# Patient Record
Sex: MALE | Race: WHITE | HISPANIC OR LATINO
[De-identification: names, ages, dates, MRNs, and addresses within clinical notes are randomized per-mention and may not be internally consistent; named-entity substitution may affect disease eponyms.]

---
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
## 2023-01-09 RX ORDER — HYDROCORTISONE 25 MG/G
2.5 OINTMENT TOPICAL
Qty: 1 | Refills: 3 | Status: ACTIVE | COMMUNITY
Start: 2017-12-12 | End: 1900-01-01

## 2023-01-30 RX ORDER — DUPILUMAB 300 MG/2ML
300 INJECTION, SOLUTION SUBCUTANEOUS
Qty: 1 | Refills: 5 | Status: ACTIVE | COMMUNITY
Start: 2022-11-01 | End: 1900-01-01

## 2023-01-31 RX ORDER — DUPILUMAB 300 MG/2ML
300 INJECTION, SOLUTION SUBCUTANEOUS
Qty: 4 | Refills: 0 | Status: ACTIVE | COMMUNITY
Start: 2022-11-01

## 2023-01-31 RX ORDER — DUPILUMAB 200 MG/1.14ML
200 INJECTION, SOLUTION SUBCUTANEOUS
Qty: 1 | Refills: 6 | Status: ACTIVE | COMMUNITY
Start: 2019-08-01

## 2023-02-01 ENCOUNTER — OUTPATIENT (OUTPATIENT)
Dept: OUTPATIENT SERVICES | Facility: HOSPITAL | Age: 18
LOS: 1 days | End: 2023-02-01

## 2023-02-01 ENCOUNTER — APPOINTMENT (OUTPATIENT)
Dept: INTERNAL MEDICINE | Facility: CLINIC | Age: 18
End: 2023-02-01

## 2023-02-02 ENCOUNTER — APPOINTMENT (OUTPATIENT)
Dept: DERMATOLOGY | Facility: CLINIC | Age: 18
End: 2023-02-02
Payer: MEDICAID

## 2023-02-02 PROCEDURE — 99215 OFFICE O/P EST HI 40 MIN: CPT | Mod: 25

## 2023-02-02 PROCEDURE — 96372 THER/PROPH/DIAG INJ SC/IM: CPT

## 2023-02-09 ENCOUNTER — NON-APPOINTMENT (OUTPATIENT)
Age: 18
End: 2023-02-09

## 2023-02-17 ENCOUNTER — APPOINTMENT (OUTPATIENT)
Dept: OPHTHALMOLOGY | Facility: CLINIC | Age: 18
End: 2023-02-17
Payer: MEDICAID

## 2023-02-17 ENCOUNTER — NON-APPOINTMENT (OUTPATIENT)
Age: 18
End: 2023-02-17

## 2023-02-17 PROCEDURE — 92012 INTRM OPH EXAM EST PATIENT: CPT

## 2023-03-13 ENCOUNTER — NON-APPOINTMENT (OUTPATIENT)
Age: 18
End: 2023-03-13

## 2023-03-14 ENCOUNTER — APPOINTMENT (OUTPATIENT)
Dept: DERMATOLOGY | Facility: CLINIC | Age: 18
End: 2023-03-14
Payer: MEDICAID

## 2023-03-14 PROCEDURE — 99214 OFFICE O/P EST MOD 30 MIN: CPT

## 2023-03-17 ENCOUNTER — APPOINTMENT (OUTPATIENT)
Dept: OPHTHALMOLOGY | Facility: CLINIC | Age: 18
End: 2023-03-17
Payer: MEDICAID

## 2023-03-17 ENCOUNTER — NON-APPOINTMENT (OUTPATIENT)
Age: 18
End: 2023-03-17

## 2023-03-17 PROCEDURE — 92012 INTRM OPH EXAM EST PATIENT: CPT

## 2023-03-20 RX ORDER — FOLIC ACID 1 MG/1
1 TABLET ORAL DAILY
Qty: 60 | Refills: 5 | Status: ACTIVE | COMMUNITY
Start: 2022-10-19 | End: 1900-01-01

## 2023-04-04 ENCOUNTER — LABORATORY RESULT (OUTPATIENT)
Age: 18
End: 2023-04-04

## 2023-04-04 ENCOUNTER — APPOINTMENT (OUTPATIENT)
Dept: DERMATOLOGY | Facility: CLINIC | Age: 18
End: 2023-04-04
Payer: MEDICAID

## 2023-04-04 VITALS — HEIGHT: 66 IN | WEIGHT: 130 LBS | BODY MASS INDEX: 20.89 KG/M2

## 2023-04-04 DIAGNOSIS — D48.5 NEOPLASM OF UNCERTAIN BEHAVIOR OF SKIN: ICD-10-CM

## 2023-04-04 PROCEDURE — 11102 TANGNTL BX SKIN SINGLE LES: CPT

## 2023-04-04 PROCEDURE — 99214 OFFICE O/P EST MOD 30 MIN: CPT | Mod: 25

## 2023-05-12 ENCOUNTER — NON-APPOINTMENT (OUTPATIENT)
Age: 18
End: 2023-05-12

## 2023-05-14 LAB — DERMATOLOGY BIOPSY: NORMAL

## 2023-05-21 ENCOUNTER — NON-APPOINTMENT (OUTPATIENT)
Age: 18
End: 2023-05-21

## 2023-05-30 ENCOUNTER — APPOINTMENT (OUTPATIENT)
Dept: DERMATOLOGY | Facility: CLINIC | Age: 18
End: 2023-05-30
Payer: MEDICAID

## 2023-05-30 PROCEDURE — 99214 OFFICE O/P EST MOD 30 MIN: CPT

## 2023-06-06 NOTE — HISTORY OF PRESENT ILLNESS
[FreeTextEntry1] : f/u AD, ?CTCL [de-identified] : Recent biopsy more suggestive of psoriasis \par \par Prior hx \par 1. Atopic dermatitis with possible early CTCL. \par - Recently here, 3/17/23, since LV, not using topical steroids on L thigh. Most recently, re-trialed dupixent x 1 dose, and noted significant achy eyes/ dizziness impacting daily life, thus stopped. Did not notice any difference in skin. Saw ophtho who gave steroid drops but decided to stop medication. Currently using tac 1 week on/ 1 week off for body, and HC for the face. Feels rash has overall been stable, notes intermittent bouts of significant pruritus\par \par Optho hx\par - evaled by DR. Land 12/23/2022 and cleared for dupi. No e/o conjunctivitis on eaxm. \par - Previously evaluated by peds optho at [facility] in 7/2021 who determined no ocular cause of blurry vision. \par \par HISTORY:\par - s/p 34 sessions of phototherapy at [facility] with an unknown amount received elsewhere outside of [facility], last treatment 5/9, stopped due to developing painful red erythema 2-3 hrs following treatment, does not feel there was any improvement with phototherapy. \par - previously improved on dupixent 200mg q 2 weeks but stopped 11/2020 2/2/ vision problems. Vision problems persists. Opthoi does not tihnk due to dupi\par - Repeat shave biopsy suggestive of early CTCL (early MF) arising in AD, read by Dr. Acevedo at [place]. TCR gene rearrangement studies were not done on 6/2021 biopsy. \par - Prev on MTX since 6/2022, most recent dose since 10/2022 25mg weekly - 2/2023.\par \par s/p patch testing 2018 -at that time sent a safe list to email address ([email or web address]) given for the allergens positive from 1-50\par - colophony\par - disperse blue mix \par - cocamidopropyl betaine\par \par M: Vaseline\par S: Cerave\par D: All Free and Clear, no fabric softener/dryer sheets

## 2023-06-06 NOTE — PHYSICAL EXAM
[Alert] : alert [Oriented x 3] : ~L oriented x 3 [Well Nourished] : well nourished [Conjunctiva Non-injected] : conjunctiva non-injected [No Visual Lymphadenopathy] : no visual  lymphadenopathy [No Clubbing] : no clubbing [No Edema] : no edema [No Chromhidrosis] : no chromhidrosis [No Bromhidrosis] : no bromhidrosis [FreeTextEntry3] : Exam notable for: \par - multiple pink to hypopigmented erythematous plaques and patches on the primarily on legs today >anterior trunk\par - hypopigmented patches noted on extremities (arms > legs)  \par - buttocks clear \par \par

## 2023-06-06 NOTE — ASSESSMENT
[FreeTextEntry1] : 1. Atopic dermatitis with possible 2. CTCL (early mycosis fungoides)\par chronic, flaring  on extensor extremities, and anterior trunk today \par Patient previously presented at  Jan-Feb, 2022\par Morphology today with pink eczematous thin plaques and xerotic patches  involving extensor extremities>anterior trunk. Face involved previously, overall inactive today.\par Prev on MTX since 6/2022, most recent dose since 10/2022 25mg weekly - 2/2023. No improvement and had hair loss. \par s/p nbUVB 1/2022-5/2022. No longer on phototherapy 2/2 red erythema 2-3 hrs following NBUVB session s/p 34 sessions at [facility] with unknown amt outside, without clinical improvement \par Skin improved on dupi but was stopped 11/2020 due to blurry vision that has persisted. Normal eye exam per optho\par Cleared by Dr. Land (optho) for dupixent 12/2022, re-tried Feb 2023 loading dose and 3 days later again developed eye symptoms and decided to self stop \par ---- multiple biopsies ----\par Shave biopsy of lesion (10/2018) w/ subacute spongiotic dermatitis consistent with AD\par Repeat broad shave 06/2021 c/w prior, spongiotic and psoriasiform dermatitis,CD4: CD8 ratio is normal, no loss of CD7, c/w subactue eczematous process \par Labs 7/21 (cbc, cmp, peripheral flow) wnl.\par 1/22 Prior Bx x3 sent to Dr. Acevedo at [place] -- C/w CTCL/early MF arising within the background of atopic dermatitis (TCR gene rearrangement studies not done)\par Most recent biopsy 4/2023 suggestive of psoriasis \par ------------------------------\par \par Reviewed prior biopsies and workup - overall pt has had biopsies suggestive of chronic atopic dermatitis without improvement on standard therapies. One biopsy suggestive of MF but not supported by other biopsies\par Given recent biopsy suggestive of psoriasis,\par Discussed trial of tx for psoriasis - reviewed spectrum of tx options including IL-17 inhibitors. Will put in rx for cosentyx. reviewed se - will rediscuss once approved \par If no improvement/worsening, plan for rebiopsy prior to proceeding with alternative systemic \par Baseline labs - cbc, cmp, quantiferon gold, hepatitis.\par \par - continue allergen avoidance and use of SAFE list (prior test in 2018). Consider repeating patch testing. \par - gentle skin care discussed, emphasized the importance of liberal Vaseline use\par - FACE: c/w HC 2.5% ointment BID to affected areas on the face prn flare, 2 weeks on/1 week off, SED\par - For the body/hands:  c/w triamcinolone 0.1% ointment BID PRN itch/roughness use for up to 2 weeks on, then 1 week off. Repeat as needed. SED\par  \par \par \par

## 2023-06-09 ENCOUNTER — APPOINTMENT (OUTPATIENT)
Dept: DERMATOLOGY | Facility: CLINIC | Age: 18
End: 2023-06-09

## 2023-06-21 ENCOUNTER — NON-APPOINTMENT (OUTPATIENT)
Age: 18
End: 2023-06-21

## 2023-06-21 ENCOUNTER — APPOINTMENT (OUTPATIENT)
Dept: OPHTHALMOLOGY | Facility: CLINIC | Age: 18
End: 2023-06-21
Payer: MEDICAID

## 2023-06-21 PROCEDURE — 92012 INTRM OPH EXAM EST PATIENT: CPT

## 2023-06-26 ENCOUNTER — APPOINTMENT (OUTPATIENT)
Dept: DERMATOLOGY | Facility: CLINIC | Age: 18
End: 2023-06-26
Payer: MEDICAID

## 2023-06-26 DIAGNOSIS — L40.9 PSORIASIS, UNSPECIFIED: ICD-10-CM

## 2023-06-26 PROCEDURE — 96372 THER/PROPH/DIAG INJ SC/IM: CPT

## 2023-06-26 PROCEDURE — 99214 OFFICE O/P EST MOD 30 MIN: CPT | Mod: 25

## 2023-06-26 PROCEDURE — 96401 CHEMO ANTI-NEOPL SQ/IM: CPT

## 2023-06-28 ENCOUNTER — OUTPATIENT (OUTPATIENT)
Dept: OUTPATIENT SERVICES | Facility: HOSPITAL | Age: 18
LOS: 1 days | End: 2023-06-28

## 2023-06-28 ENCOUNTER — APPOINTMENT (OUTPATIENT)
Dept: INTERNAL MEDICINE | Facility: CLINIC | Age: 18
End: 2023-06-28

## 2023-06-28 RX ORDER — SECUKINUMAB 150 MG/ML
INJECTION SUBCUTANEOUS
Qty: 5 | Refills: 1 | Status: ACTIVE | COMMUNITY
Start: 2023-06-06

## 2023-06-29 ENCOUNTER — APPOINTMENT (OUTPATIENT)
Dept: DERMATOLOGY | Facility: CLINIC | Age: 18
End: 2023-06-29
Payer: MEDICAID

## 2023-06-29 PROCEDURE — 96401 CHEMO ANTI-NEOPL SQ/IM: CPT

## 2023-06-29 PROCEDURE — 99214 OFFICE O/P EST MOD 30 MIN: CPT | Mod: 25

## 2023-06-29 PROCEDURE — 96372 THER/PROPH/DIAG INJ SC/IM: CPT

## 2023-06-29 NOTE — ASSESSMENT
[FreeTextEntry1] : 1. Atopic dermatitis with possible 2. CTCL (early mycosis fungoides)\par Chronic, flaring  on extensor extremities, and anterior trunk today \par Patient previously presented at  Jan-Feb, 2022\par Morphology today with pink eczematous thin plaques and xerotic patches  involving extensor extremities>anterior trunk. Face involved previously, overall inactive today.\par Prev on MTX since 6/2022, most recent dose since 10/2022 25mg weekly - 2/2023. No improvement and had hair loss. \par s/p nbUVB 1/2022-5/2022. No longer on phototherapy 2/2 red erythema 2-3 hrs following NBUVB session s/p 34 sessions at [facility] with unknown amt outside, without clinical improvement \par Skin improved on dupi but was stopped 11/2020 due to blurry vision that has persisted. Normal eye exam per optho\par Cleared by Dr. Land (optho) for dupixent 12/2022, re-tried Feb 2023 loading dose and 3 days later again developed eye symptoms and decided to self stop \par ---- multiple biopsies ----\par Shave biopsy of lesion (10/2018) w/ subacute spongiotic dermatitis consistent with AD\par Repeat broad shave 06/2021 c/w prior, spongiotic and psoriasiform dermatitis,CD4: CD8 ratio is normal, no loss of CD7, c/w subactue eczematous process \par Labs 7/21 (cbc, cmp, peripheral flow) wnl.\par 1/22 Prior Bx x3 sent to Dr. Acevedo at [place] -- C/w CTCL/early MF arising within the background of atopic dermatitis (TCR gene rearrangement studies not done)\par Most recent biopsy 4/2023 suggestive of #psoriasis \par ------------------------------\par \par Reviewed prior biopsies and workup - overall pt has had biopsies suggestive of chronic atopic dermatitis without improvement on standard therapies. One biopsy suggestive of MF but not supported by other biopsies\par Given recent biopsy suggestive of psoriasis, Dr. Quezada previously discussed trial of tx for psoriasis - reviewed spectrum of tx options including IL-17 inhibitors. \par Start Cosentyx (Secukinumab)300 mg SC once weekly on weeks 0,1,2,3,4, then once every 4 weeks for maintenance. \par \par Extensively discussed secukinumab administration. \par 150 mg secukinumab administered into Patient's left anterior thigh, and Patient's Mom was able to administer 150 mg SC to his right thigh.\par Lot [id number]\par Exp 6/2024\par \par - Reviewed biologics and discussed the possible adverse effects of this medication, including but not limited to common and uncommon side effects of biologic medications including injection site reactions, flu-like symptoms, skin and soft tissue infections, candidiasis, pneumonia, tuberculosis, other serious and opportunistic infections, low blood counts, inflammation of the liver, hypersensitivity reaction, new onset of inflammatory bowel disease, multiple sclerosis, heart failure, and cancers.  \par - We have discussed that pregnancy is non-viable result in significant fetal abnormality while on this medication and 3 months after completing course, and that the Patient must take adequate measures to eliminate risk of conceiving or participation in conceiving while on this medication.\par Patient expressed understanding of these risks.\par \par If no improvement/worsening, plan for rebiopsy prior to proceeding with alternative systemic. \par \par - continue allergen avoidance and use of SAFE list (prior test in 2018). Consider repeating patch testing. \par - gentle skin care discussed, emphasized the importance of liberal Vaseline use\par - FACE: c/w HC 2.5% ointment BID to affected areas on the face prn flare, 2 weeks on/1 week off, SED\par - For the body/hands:  c/w triamcinolone 0.1% ointment BID PRN itch/roughness use for up to 2 weeks on, then 1 week off. Repeat as needed. SED. \par \par 3. High Risk Medication Use\par - Reviewed CBC w/ diff, CMP, Hep B core Ab, surface Ab, surface Ag, Reviewed Hep C Ab w/ reflex from May 2023- WNL. \par - Reviewed Quantiferon plus TB from June 2022- non-reactive. \par \par F/u with Dr. Quezada in 1-2 months or sooner as warranted. \par \par \par \par \par

## 2023-06-29 NOTE — PHYSICAL EXAM
[Alert] : alert [Oriented x 3] : ~L oriented x 3 [Well Nourished] : well nourished [Conjunctiva Non-injected] : conjunctiva non-injected [No Visual Lymphadenopathy] : no visual  lymphadenopathy [No Clubbing] : no clubbing [No Edema] : no edema [No Bromhidrosis] : no bromhidrosis [No Chromhidrosis] : no chromhidrosis [FreeTextEntry3] : Exam notable for: \par - multiple pink to hypopigmented erythematous plaques and patches on the primarily on legs today >anterior trunk\par - hypopigmented patches noted on extremities (arms > legs)  \par - buttocks clear \par \par

## 2023-06-29 NOTE — HISTORY OF PRESENT ILLNESS
[FreeTextEntry1] : f/u AD, ?CTCL [de-identified] : Recent biopsy more suggestive of psoriasis. Here to start Cosentyx. \par Presents today with Mom who wishes to learn injection training. Mom is Vietnamese speaking and denies a , preferring Patient to translate this visit. \par \par Prior hx \par 1. Atopic dermatitis with possible early CTCL. \par - Recently here, 3/17/23, since LV, not using topical steroids on L thigh. Most recently, re-trialed dupixent x 1 dose, and noted significant achy eyes/ dizziness impacting daily life, thus stopped. Did not notice any difference in skin. Saw ophtho who gave steroid drops but decided to stop medication. Currently using tac 1 week on/ 1 week off for body, and HC for the face. Feels rash has overall been stable, notes intermittent bouts of significant pruritus\par \par Optho hx\par - evaled by DR. Land 12/23/2022 and cleared for dupi. No e/o conjunctivitis on eaxm. \par - Previously evaluated by peds optho at [facility] in 7/2021 who determined no ocular cause of blurry vision. \par \par HISTORY:\par - s/p 34 sessions of phototherapy at [facility] with an unknown amount received elsewhere outside of [facility], last treatment 5/9, stopped due to developing painful red erythema 2-3 hrs following treatment, does not feel there was any improvement with phototherapy. \par - previously improved on dupixent 200mg q 2 weeks but stopped 11/2020 2/2/ vision problems. Vision problems persists. Opthoi does not tihnk due to dupi\par - Repeat shave biopsy suggestive of early CTCL (early MF) arising in AD, read by Dr. Acevedo at [place]. TCR gene rearrangement studies were not done on 6/2021 biopsy. \par - Prev on MTX since 6/2022, most recent dose since 10/2022 25mg weekly - 2/2023.\par \par s/p patch testing 2018 -at that time sent a safe list to email address ([email or web address]) given for the allergens positive from 1-50\par - colophony\par - disperse blue mix \par - cocamidopropyl betaine\par \par M: Vaseline\par S: Cerave\par D: All Free and Clear, no fabric softener/dryer sheets

## 2023-07-09 LAB
ALBUMIN SERPL ELPH-MCNC: 5.1 G/DL
ALP BLD-CCNC: 70 U/L
ALT SERPL-CCNC: 32 U/L
ANION GAP SERPL CALC-SCNC: 13 MMOL/L
AST SERPL-CCNC: 27 U/L
BILIRUB SERPL-MCNC: 0.3 MG/DL
BUN SERPL-MCNC: 12 MG/DL
CALCIUM SERPL-MCNC: 10.4 MG/DL
CHLORIDE SERPL-SCNC: 102 MMOL/L
CO2 SERPL-SCNC: 27 MMOL/L
CREAT SERPL-MCNC: 0.72 MG/DL
EGFR: 135 ML/MIN/1.73M2
GLUCOSE SERPL-MCNC: 87 MG/DL
HBV CORE IGG+IGM SER QL: NONREACTIVE
HBV SURFACE AB SER QL: REACTIVE
HBV SURFACE AG SER QL: NONREACTIVE
HCV AB SER QL: NONREACTIVE
HCV S/CO RATIO: 0.15 S/CO
POTASSIUM SERPL-SCNC: 4.1 MMOL/L
PROT SERPL-MCNC: 7.5 G/DL
SODIUM SERPL-SCNC: 142 MMOL/L

## 2023-07-10 PROBLEM — L40.9 PSORIASIS: Status: ACTIVE | Noted: 2023-06-06

## 2023-07-27 ENCOUNTER — NON-APPOINTMENT (OUTPATIENT)
Age: 18
End: 2023-07-27

## 2023-08-29 ENCOUNTER — APPOINTMENT (OUTPATIENT)
Dept: DERMATOLOGY | Facility: CLINIC | Age: 18
End: 2023-08-29

## 2023-10-09 ENCOUNTER — NON-APPOINTMENT (OUTPATIENT)
Age: 18
End: 2023-10-09

## 2023-10-23 RX ORDER — SECUKINUMAB 150 MG/ML
150 INJECTION SUBCUTANEOUS
Qty: 4 | Refills: 2 | Status: ACTIVE | COMMUNITY
Start: 2023-06-08 | End: 1900-01-01

## 2023-10-24 ENCOUNTER — LABORATORY RESULT (OUTPATIENT)
Age: 18
End: 2023-10-24

## 2023-10-24 ENCOUNTER — APPOINTMENT (OUTPATIENT)
Dept: DERMATOLOGY | Facility: CLINIC | Age: 18
End: 2023-10-24
Payer: MEDICAID

## 2023-10-24 PROCEDURE — 99215 OFFICE O/P EST HI 40 MIN: CPT

## 2023-10-24 RX ORDER — FLUOCINOLONE ACETONIDE 0.11 MG/ML
0.01 OIL TOPICAL
Qty: 1 | Refills: 3 | Status: ACTIVE | COMMUNITY
Start: 2021-06-09 | End: 1900-01-01

## 2023-10-31 ENCOUNTER — APPOINTMENT (OUTPATIENT)
Dept: DERMATOLOGY | Facility: CLINIC | Age: 18
End: 2023-10-31

## 2023-10-31 ENCOUNTER — NON-APPOINTMENT (OUTPATIENT)
Age: 18
End: 2023-10-31

## 2023-11-16 ENCOUNTER — APPOINTMENT (OUTPATIENT)
Dept: DERMATOLOGY | Facility: CLINIC | Age: 18
End: 2023-11-16

## 2023-11-21 ENCOUNTER — APPOINTMENT (OUTPATIENT)
Dept: DERMATOLOGY | Facility: CLINIC | Age: 18
End: 2023-11-21

## 2023-11-30 ENCOUNTER — NON-APPOINTMENT (OUTPATIENT)
Age: 18
End: 2023-11-30

## 2023-12-05 ENCOUNTER — NON-APPOINTMENT (OUTPATIENT)
Age: 18
End: 2023-12-05

## 2023-12-05 ENCOUNTER — RX RENEWAL (OUTPATIENT)
Age: 18
End: 2023-12-05

## 2023-12-07 ENCOUNTER — APPOINTMENT (OUTPATIENT)
Dept: DERMATOLOGY | Facility: CLINIC | Age: 18
End: 2023-12-07
Payer: MEDICAID

## 2023-12-07 DIAGNOSIS — L21.9 SEBORRHEIC DERMATITIS, UNSPECIFIED: ICD-10-CM

## 2023-12-07 DIAGNOSIS — C84.A0 CUTANEOUS T-CELL LYMPHOMA, UNSPECIFIED, UNSPECIFIED SITE: ICD-10-CM

## 2023-12-07 PROCEDURE — 96372 THER/PROPH/DIAG INJ SC/IM: CPT

## 2023-12-07 PROCEDURE — 99214 OFFICE O/P EST MOD 30 MIN: CPT | Mod: 25

## 2023-12-07 PROCEDURE — 96401 CHEMO ANTI-NEOPL SQ/IM: CPT

## 2023-12-07 RX ORDER — TRALOKINUMAB-LDRM 150 MG/ML
150 INJECTION, SOLUTION SUBCUTANEOUS
Qty: 2 | Refills: 0 | Status: ACTIVE | COMMUNITY
Start: 2023-10-30

## 2023-12-21 ENCOUNTER — APPOINTMENT (OUTPATIENT)
Dept: DERMATOLOGY | Facility: CLINIC | Age: 18
End: 2023-12-21
Payer: MEDICAID

## 2023-12-21 DIAGNOSIS — L20.9 ATOPIC DERMATITIS, UNSPECIFIED: ICD-10-CM

## 2023-12-21 PROCEDURE — 99215 OFFICE O/P EST HI 40 MIN: CPT | Mod: 25

## 2023-12-21 PROCEDURE — 11901 INJECT SKIN LESIONS >7: CPT

## 2024-01-08 ENCOUNTER — APPOINTMENT (OUTPATIENT)
Dept: DERMATOLOGY | Facility: CLINIC | Age: 19
End: 2024-01-08
Payer: MEDICAID

## 2024-01-08 PROCEDURE — 11901 INJECT SKIN LESIONS >7: CPT

## 2024-01-08 PROCEDURE — 99214 OFFICE O/P EST MOD 30 MIN: CPT | Mod: 25

## 2024-02-01 ENCOUNTER — APPOINTMENT (OUTPATIENT)
Dept: DERMATOLOGY | Facility: CLINIC | Age: 19
End: 2024-02-01
Payer: MEDICAID

## 2024-02-01 PROCEDURE — 11901 INJECT SKIN LESIONS >7: CPT

## 2024-02-01 PROCEDURE — 99214 OFFICE O/P EST MOD 30 MIN: CPT | Mod: 25

## 2024-02-01 RX ORDER — KETOCONAZOLE 20.5 MG/ML
2 SHAMPOO, SUSPENSION TOPICAL
Qty: 1 | Refills: 5 | Status: ACTIVE | COMMUNITY
Start: 2019-12-05 | End: 1900-01-01

## 2024-02-09 RX ORDER — TRIAMCINOLONE ACETONIDE 1 MG/G
0.1 OINTMENT TOPICAL
Qty: 1 | Refills: 1 | Status: ACTIVE | COMMUNITY
Start: 2018-10-24 | End: 1900-01-01

## 2024-03-05 ENCOUNTER — APPOINTMENT (OUTPATIENT)
Dept: DERMATOLOGY | Facility: CLINIC | Age: 19
End: 2024-03-05
Payer: MEDICAID

## 2024-03-05 PROCEDURE — 11901 INJECT SKIN LESIONS >7: CPT

## 2024-03-05 PROCEDURE — 99214 OFFICE O/P EST MOD 30 MIN: CPT | Mod: 25

## 2024-04-23 ENCOUNTER — RX RENEWAL (OUTPATIENT)
Age: 19
End: 2024-04-23

## 2024-04-30 RX ORDER — CLOBETASOL PROPIONATE 0.5 MG/ML
0.05 SOLUTION TOPICAL
Qty: 1 | Refills: 2 | Status: ACTIVE | COMMUNITY
Start: 2022-07-26 | End: 1900-01-01

## 2024-05-24 RX ORDER — TRALOKINUMAB-LDRM 150 MG/ML
150 INJECTION, SOLUTION SUBCUTANEOUS
Qty: 1 | Refills: 5 | Status: ACTIVE | COMMUNITY
Start: 2023-10-30

## 2024-06-03 ENCOUNTER — APPOINTMENT (OUTPATIENT)
Dept: DERMATOLOGY | Facility: CLINIC | Age: 19
End: 2024-06-03
Payer: COMMERCIAL

## 2024-06-03 DIAGNOSIS — Z79.899 OTHER LONG TERM (CURRENT) DRUG THERAPY: ICD-10-CM

## 2024-06-03 DIAGNOSIS — L63.9 ALOPECIA AREATA, UNSPECIFIED: ICD-10-CM

## 2024-06-03 DIAGNOSIS — L30.9 DERMATITIS, UNSPECIFIED: ICD-10-CM

## 2024-06-03 PROCEDURE — 99214 OFFICE O/P EST MOD 30 MIN: CPT

## 2024-06-03 RX ORDER — TACROLIMUS 1 MG/G
0.1 OINTMENT TOPICAL
Qty: 1 | Refills: 3 | Status: ACTIVE | COMMUNITY
Start: 2018-01-30 | End: 1900-01-01

## 2024-06-07 LAB
ALBUMIN SERPL ELPH-MCNC: 4.8 G/DL
ALP BLD-CCNC: 63 U/L
ALT SERPL-CCNC: 32 U/L
ANION GAP SERPL CALC-SCNC: 14 MMOL/L
AST SERPL-CCNC: 27 U/L
BILIRUB SERPL-MCNC: 0.3 MG/DL
BUN SERPL-MCNC: 18 MG/DL
CALCIUM SERPL-MCNC: 9.9 MG/DL
CHLORIDE SERPL-SCNC: 100 MMOL/L
CHOLEST SERPL-MCNC: 145 MG/DL
CO2 SERPL-SCNC: 23 MMOL/L
CREAT SERPL-MCNC: 0.79 MG/DL
EGFR: 130 ML/MIN/1.73M2
GLUCOSE SERPL-MCNC: 83 MG/DL
HBV CORE IGG+IGM SER QL: NONREACTIVE
HBV CORE IGM SER QL: NONREACTIVE
HBV SURFACE AB SER QL: REACTIVE
HBV SURFACE AG SER QL: NONREACTIVE
HCV AB SER QL: NONREACTIVE
HCV S/CO RATIO: 0.15 S/CO
HDLC SERPL-MCNC: 38 MG/DL
LDLC SERPL CALC-MCNC: 91 MG/DL
M TB IFN-G BLD-IMP: NEGATIVE
NONHDLC SERPL-MCNC: 107 MG/DL
POTASSIUM SERPL-SCNC: 4.6 MMOL/L
PROT SERPL-MCNC: 7.7 G/DL
QUANTIFERON TB PLUS MITOGEN MINUS NIL: 2.25 IU/ML
QUANTIFERON TB PLUS NIL: 0.03 IU/ML
QUANTIFERON TB PLUS TB1 MINUS NIL: 0 IU/ML
QUANTIFERON TB PLUS TB2 MINUS NIL: 0 IU/ML
SODIUM SERPL-SCNC: 137 MMOL/L
TRIGL SERPL-MCNC: 84 MG/DL

## 2024-06-10 RX ORDER — UPADACITINIB 15 MG/1
15 TABLET, EXTENDED RELEASE ORAL
Qty: 2 | Refills: 2 | Status: ACTIVE | COMMUNITY
Start: 2024-06-06

## 2024-06-10 NOTE — ASSESSMENT
[FreeTextEntry1] : 1. Dermatitis: favor Atopic dermatitis, prior concern for possible CTCL (early mycosis fungoides, see bx reports). One biopsy also suggestive of possible psoriasis. However, failed cosentyx in the past  Chronic, continues to flare despite Adbry Patient previously presented at  Jan-Feb, 2022 Prev on MTX since 6/2022, most recent dose since 10/2022 25mg weekly - 2/2023. No improvement and had hair loss. s/p nbUVB 1/2022-5/2022. No longer on phototherapy 2/2 red erythema 2-3 hrs following NBUVB session s/p 34 sessions at [facility] with unknown amt outside, without clinical improvement Cosentyx for possible psoriasis in June 2023 however his skin not improved.  Skin improved on dupi but was stopped 11/2020 due to blurry vision that has persisted. Normal eye exam per optho Cleared by Dr. Land (optho) for dupixent 12/2022, re-tried Feb 2023 loading dose and 3 days later again developed eye symptoms and decided to self stop On adbry since 12/7/2023  ---- multiple biopsies ---- Shave biopsy of lesion (10/2018) w/ subacute spongiotic dermatitis consistent with AD Repeat broad shave 06/2021 c/w prior, spongiotic and psoriasiform dermatitis,CD4: CD8 ratio is normal, no loss of CD7, c/w subactue eczematous process Labs 7/21 (cbc, cmp, peripheral flow) wnl. 1/22 Prior Bx x3 sent to Dr. Acevedo at [place] -- C/w CTCL/early MF arising within the background of atopic dermatitis (TCR gene rearrangement studies not done) Most recent biopsy 4/2023 suggestive of #psoriasis ------------------------------  PLAN: - STOP Adbry 300 mg SC every other week.  - continue allergen avoidance and use of SAFE list (prior test in 2018). Consider repeating patch testing. - gentle skin care discussed, emphasized the importance of liberal Vaseline use - FACE: c/w HC 2.5% ointment BID to affected areas on the face prn flare, 2 weeks on/1 week off, SED - For the body/hands: c/w triamcinolone 0.1% ointment BID PRN itch/roughness use for up to 2 weeks on, then 1 week off. Repeat as needed. SED. - RE-START tacrolimus 0.1% BID in between flares  - Discussed that it is too soon to determine efficacy of Adbry, will need to give it more time but in future if it is not improving skin can consider alternatives such as PO Jennifer, which may also provide benefit for AA as described below - start zyrtec nightly for itch -START Rinvoq 15mg daily pending bloodwork SED. suppresses immune system, increased risk of blood clots, elevated cholesterol, decreased platelets  Hair loss, likely 2/2 Alopecia Areata Fungal culture from 11/07/23 negative.  - Resolved s/p ILK x 4   High Risk Medication Use, Rinvoq  - Ordered CBC, CMP, lipid profile, hepatitis panel, and quant tb this visit  RTC 6 weeks

## 2024-06-10 NOTE — PHYSICAL EXAM
[FreeTextEntry3] : Exam notable for: - multiple pink to hypopigmented erythematous plaques and patches on the arms, thighs, trunk, improved from prior  - scalp without scale and circular patches of hair loss

## 2024-06-10 NOTE — HISTORY OF PRESENT ILLNESS
[FreeTextEntry1] : f/u hair loss and rash  [de-identified] : ISABELLA HICKS is a 18-year-old male who presents for evaluation of the above.  1. AA s/p ILK 10 x1 and ILK 5 x1, 4 sessions total w/ significant improvement  2. Dermatitis  - continues on Adbry 300 mg sc every other week, tolerating well, no side effects - today skin is improved, using topical steroids PRN flares, overall slightly improved  - itch and flaking on scalp improved with keto shampoo and clobetasol solution - currently using vaseline as moisturizer  - describing redness and burning after sun exposure   Optho hx - evaled by DR. Land 12/23/2022 and cleared for dupi. No e/o conjunctivitis on eaxm. - Previously evaluated by peds optho at [facility] in 7/2021 who determined no ocular cause of blurry vision.  HISTORY: - s/p 34 sessions of phototherapy at [facility] with an unknown amount received elsewhere outside of [facility], last treatment 5/9, stopped due to developing painful red erythema 2-3 hrs following treatment, does not feel there was any improvement with phototherapy. - previously improved on dupixent 200mg q 2 weeks but stopped 11/2020 2/2/ vision problems. Vision problems persists. Opthoi does not think due to dupi - re-trialed dupixent x 1 dose, and noted significant achy eyes/ dizziness impacting daily life, thus stopped. Did not notice any difference in skin. 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 Acevedo at [place]. TCR gene rearrangement studies were not done on 6/2021 biopsy. - Prev on MTX since 6/2022, most recent dose since 10/2022 25mg weekly - 2/2023.  s/p patch testing 2018 -at that time sent a safe list to email address ([email or web address]) given for the allergens positive from 1-50 - colophony - disperse blue mix - cocamidopropyl betaine  M: Vaseline S: Cerave D: All Free and Clear, no fabric softener/dryer sheets

## 2024-07-16 ENCOUNTER — APPOINTMENT (OUTPATIENT)
Dept: DERMATOLOGY | Facility: CLINIC | Age: 19
End: 2024-07-16
Payer: COMMERCIAL

## 2024-07-16 DIAGNOSIS — L21.9 SEBORRHEIC DERMATITIS, UNSPECIFIED: ICD-10-CM

## 2024-07-16 DIAGNOSIS — L30.9 DERMATITIS, UNSPECIFIED: ICD-10-CM

## 2024-07-16 PROCEDURE — G2211 COMPLEX E/M VISIT ADD ON: CPT

## 2024-07-16 PROCEDURE — 99214 OFFICE O/P EST MOD 30 MIN: CPT

## 2024-08-19 ENCOUNTER — NON-APPOINTMENT (OUTPATIENT)
Age: 19
End: 2024-08-19

## 2024-08-20 ENCOUNTER — APPOINTMENT (OUTPATIENT)
Dept: DERMATOLOGY | Facility: CLINIC | Age: 19
End: 2024-08-20
Payer: COMMERCIAL

## 2024-08-20 VITALS — HEIGHT: 66 IN | BODY MASS INDEX: 22.5 KG/M2 | WEIGHT: 140 LBS

## 2024-08-20 DIAGNOSIS — L20.9 ATOPIC DERMATITIS, UNSPECIFIED: ICD-10-CM

## 2024-08-20 PROCEDURE — G2211 COMPLEX E/M VISIT ADD ON: CPT

## 2024-08-20 PROCEDURE — 99215 OFFICE O/P EST HI 40 MIN: CPT

## 2024-08-21 NOTE — ASSESSMENT
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  - One biopsy also suggestive of possible psoriasis. However, failed cosentyx in the past  Chronic, not at goal Patient previously presented at  Jan-Feb, 2022 s/p patch testing 2018 -at that time sent a safe list to email address ([email or web address]) given for the allergens positive from 1-50 - colophony - disperse blue mix - cocamidopropyl betaine  [place] HISTORY:  - Dupixent 9/2019-11/2020, stopped due to blurry vision that has persisted. Normal eye exam per optho (Cleared by Dr. Land) for dupixent 12/2022, re-tried Feb 2023 loading dose and 3 days later again developed eye symptoms and decided to self-stop - nbUVB 1/2022-5/2022. No longer on phototherapy 2/2 red erythema 2-3 hrs following NBUVB session s/p 34 sessions at [facility] with unknown amt outside, without clinical improvement - MTX 6/2022-2/2023 - Cosentyx for possible psoriasis in June 2023 however his skin not improved.  - Adbry 12/7/2023 to 7/2024 - Rinvoq 15 mg daily since early July 2024- self stopped 2 weeks ago due to eye symptoms. Has not been to ophtho for re-evaluation.  Skin is flaring off all treatments   ---- multiple biopsies ---- Shave biopsy of lesion (10/2018) w/ subacute spongiotic dermatitis consistent with AD Repeat broad shave 06/2021 c/w prior, spongiotic and psoriasiform dermatitis,CD4: CD8 ratio is normal, no loss of CD7, c/w subactue eczematous process Labs 7/21 (cbc, cmp, peripheral flow) wnl. 1/22 Prior Bx x3 sent to Dr. Acevedo at [place] -- C/w CTCL/early MF arising within the background of atopic dermatitis (TCR gene rearrangement studies not done) Most recent biopsy 4/2023 suggestive of #psoriasis ------------------------------  PLAN: - Recommend ophthalmology (Dr. Land) for evaluation of eye symptoms. May consider neurology consult if ophtho evaluation normal.  - Recommend continuing on Rinvoq 15 mg daily, unable to determine clinical benefit at this time due to intermittent course. Does note hair is falling out more without rinvoq.  - Continue allergen avoidance and use of SAFE list (prior test in 2018). Consider repeating patch testing. - Gentle skin care discussed, emphasized the importance of liberal Vaseline use - For face: continue HC 2.5% ointment BID to affected areas on the face prn flare, 2 weeks on/1 week off, SED - For the body/hands: continue triamcinolone 0.1% ointment BID PRN itch/roughness use for up to 2 weeks on, then 1 week off. Repeat as needed. SED. - Continue tacrolimus 0.1% BID in between flares  - Continue zyrtec nightly for itch prn  # Hair loss, likely 2/2 Alopecia Areata Fungal culture from 11/07/23 negative.  - Resolved s/p ILK x 4 , however pt reports return of symptoms with hair falling out and scalp itch - START clobetasol solution PRN for itch  High Risk Medication Use, Rinvoq  - CBC, CMP, lipid profile, hepatitis panel, and quant tb 6/3/2024 wnl - r/b/a Jennifer discussed including but not limited to immunosuppression, LFT elevation, HLD and blood clots  RTC 1 month   >45 min

## 2024-08-21 NOTE — ASSESSMENT
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  - One biopsy also suggestive of possible psoriasis. However, failed cosentyx in the past  Chronic, not at goal Patient previously presented at  Jan-Feb, 2022 s/p patch testing 2018 -at that time sent a safe list to email address ([email or web address]) given for the allergens positive from 1-50 - colophony - disperse blue mix - cocamidopropyl betaine  [place] HISTORY:  - Dupixent 9/2019-11/2020, stopped due to blurry vision that has persisted. Normal eye exam per optho (Cleared by Dr. Land) for dupixent 12/2022, re-tried Feb 2023 loading dose and 3 days later again developed eye symptoms and decided to self-stop - nbUVB 1/2022-5/2022. No longer on phototherapy 2/2 red erythema 2-3 hrs following NBUVB session s/p 34 sessions at [facility] with unknown amt outside, without clinical improvement - MTX 6/2022-2/2023 - Cosentyx for possible psoriasis in June 2023 however his skin not improved.  - Adbry 12/7/2023 to 7/2024 - Rinvoq 15 mg daily since early July 2024- self stopped 2 weeks ago due to eye symptoms. Has not been to ophtho for re-evaluation.  Skin is flaring off all treatments   ---- multiple biopsies ---- Shave biopsy of lesion (10/2018) w/ subacute spongiotic dermatitis consistent with AD Repeat broad shave 06/2021 c/w prior, spongiotic and psoriasiform dermatitis,CD4: CD8 ratio is normal, no loss of CD7, c/w subactue eczematous process Labs 7/21 (cbc, cmp, peripheral flow) wnl. 1/22 Prior Bx x3 sent to Dr. Acevedo at [place] -- C/w CTCL/early MF arising within the background of atopic dermatitis (TCR gene rearrangement studies not done) Most recent biopsy 4/2023 suggestive of #psoriasis ------------------------------  PLAN: - Recommend ophthalmology (Dr. Land) for evaluation of eye symptoms. May consider neurology consult if ophtho evaluation normal.  - Recommend continuing on Rinvoq 15 mg daily, unable to determine clinical benefit at this time due to intermittent course. Does note hair is falling out more without rinvoq.  - Continue allergen avoidance and use of SAFE list (prior test in 2018). Consider repeating patch testing. - Gentle skin care discussed, emphasized the importance of liberal Vaseline use - For face: continue HC 2.5% ointment BID to affected areas on the face prn flare, 2 weeks on/1 week off, SED - For the body/hands: continue triamcinolone 0.1% ointment BID PRN itch/roughness use for up to 2 weeks on, then 1 week off. Repeat as needed. SED. - Continue tacrolimus 0.1% BID in between flares  - Continue zyrtec nightly for itch prn  # Hair loss, likely 2/2 Alopecia Areata Fungal culture from 11/07/23 negative.  - Resolved s/p ILK x 4 , however pt reports return of symptoms with hair falling out and scalp itch - START clobetasol solution PRN for itch  High Risk Medication Use, Rinvoq  - CBC, CMP, lipid profile, hepatitis panel, and quant tb 6/3/2024 wnl - r/b/a Jennifer discussed including but not limited to immunosuppression, LFT elevation, HLD and blood clots  RTC 1 month   >45 min

## 2024-08-21 NOTE — PHYSICAL EXAM
[FreeTextEntry3] : Exam notable for: - multiple pink to hypopigmented scaley patches on the trunk, knees, arms - scalp with no discrete patches of hair loss

## 2024-08-21 NOTE — HISTORY OF PRESENT ILLNESS
[FreeTextEntry1] : f/u hair loss and rash  [de-identified] : ISABELLA HICKS is a 18-year-old male who presents for evaluation of the above.  Stopped Rinvoq 1.5 weeks ago (started ~6/3/2024) due to blurry vision and difficulties focusing. Reports improvement in eye symptoms after stopping Rinvoq, but not complete resolution. Has not seen Dr. Land for evaluation this time. Regarding his skin, he states back was flaring recently, but improved with triamcinolone ointment. Also feels that hair has been falling out similarly to how things were before his AA flared.   # AA s/p ILK 10 x1 and ILK 5 x1, 4 sessions total w/ significant improvement  Optho hx - evaled by DR. Land 12/23/2022 and cleared for dupi. No e/o conjunctivitis on eaxm. - Previously evaluated by peds optho at [facility] in 7/2021 who determined no ocular cause of blurry vision.  HISTORY: - s/p 34 sessions of phototherapy at [facility] with an unknown amount received elsewhere outside of [facility], last treatment 5/9, stopped due to developing painful red erythema 2-3 hrs following treatment, does not feel there was any improvement with phototherapy. - previously improved on dupixent 200mg q 2 weeks but stopped 11/2020 2/2/ vision problems. Vision problems persists. Optho does not think due to dupi - re-trialed dupixent x 1 dose, and noted significant achy eyes/ dizziness impacting daily life, thus stopped. Did not notice any difference in skin. 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 Acevedo at [place]. TCR gene rearrangement studies were not done on 6/2021 biopsy. - Prev on MTX since 6/2022, most recent dose since 10/2022 25mg weekly - 2/2023. - S/p Adbry 12/7/23-6/3/24 w/ no improvement (no eye symptoms)  s/p patch testing 2018 -at that time sent a safe list to email address ([email or web address]) given for the allergens positive from 1-50 - colophony - disperse blue mix - cocamidopropyl betaine  M: Vaseline S: Cerave D: All Free and Clear, no fabric softener/dryer sheets

## 2024-08-21 NOTE — HISTORY OF PRESENT ILLNESS
[FreeTextEntry1] : f/u hair loss and rash  [de-identified] : ISABELLA HICKS is a 18-year-old male who presents for evaluation of the above.  Stopped Rinvoq 1.5 weeks ago (started ~6/3/2024) due to blurry vision and difficulties focusing. Reports improvement in eye symptoms after stopping Rinvoq, but not complete resolution. Has not seen Dr. Land for evaluation this time. Regarding his skin, he states back was flaring recently, but improved with triamcinolone ointment. Also feels that hair has been falling out similarly to how things were before his AA flared.   # AA s/p ILK 10 x1 and ILK 5 x1, 4 sessions total w/ significant improvement  Optho hx - evaled by DR. Land 12/23/2022 and cleared for dupi. No e/o conjunctivitis on eaxm. - Previously evaluated by peds optho at [facility] in 7/2021 who determined no ocular cause of blurry vision.  HISTORY: - s/p 34 sessions of phototherapy at [facility] with an unknown amount received elsewhere outside of [facility], last treatment 5/9, stopped due to developing painful red erythema 2-3 hrs following treatment, does not feel there was any improvement with phototherapy. - previously improved on dupixent 200mg q 2 weeks but stopped 11/2020 2/2/ vision problems. Vision problems persists. Optho does not think due to dupi - re-trialed dupixent x 1 dose, and noted significant achy eyes/ dizziness impacting daily life, thus stopped. Did not notice any difference in skin. 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 Acevedo at [place]. TCR gene rearrangement studies were not done on 6/2021 biopsy. - Prev on MTX since 6/2022, most recent dose since 10/2022 25mg weekly - 2/2023. - S/p Adbry 12/7/23-6/3/24 w/ no improvement (no eye symptoms)  s/p patch testing 2018 -at that time sent a safe list to email address ([email or web address]) given for the allergens positive from 1-50 - colophony - disperse blue mix - cocamidopropyl betaine  M: Vaseline S: Cerave D: All Free and Clear, no fabric softener/dryer sheets

## 2024-09-04 ENCOUNTER — NON-APPOINTMENT (OUTPATIENT)
Age: 19
End: 2024-09-04

## 2024-09-04 ENCOUNTER — APPOINTMENT (OUTPATIENT)
Dept: OPHTHALMOLOGY | Facility: CLINIC | Age: 19
End: 2024-09-04
Payer: COMMERCIAL

## 2024-09-04 PROCEDURE — 92014 COMPRE OPH EXAM EST PT 1/>: CPT

## 2024-09-19 ENCOUNTER — APPOINTMENT (OUTPATIENT)
Dept: DERMATOLOGY | Facility: CLINIC | Age: 19
End: 2024-09-19
Payer: COMMERCIAL

## 2024-09-19 PROCEDURE — 99214 OFFICE O/P EST MOD 30 MIN: CPT

## 2024-09-19 PROCEDURE — G2211 COMPLEX E/M VISIT ADD ON: CPT

## 2024-09-19 NOTE — HISTORY OF PRESENT ILLNESS
[FreeTextEntry1] : f/u hair loss and rash  [de-identified] : ISABELLA HICKS is a 18-year-old male who presents for evaluation of the above.  #off rinvoq because it affects his vision and has exams coming up, skin flaring now that he is off all treatment, also feels like he is strating to lose hair again.    # AA s/p ILK 10 x1 and ILK 5 x1, 4 sessions total w/ significant improvement  Optho hx - evaled by DR. Land 12/23/2022 and cleared for dupi. No e/o conjunctivitis on eaxm. - Previously evaluated by peds optho at [facility] in 7/2021 who determined no ocular cause of blurry vision.  HISTORY: - s/p 34 sessions of phototherapy at [facility] with an unknown amount received elsewhere outside of [facility], last treatment 5/9, stopped due to developing painful red erythema 2-3 hrs following treatment, does not feel there was any improvement with phototherapy. - previously improved on dupixent 200mg q 2 weeks but stopped 11/2020 2/2/ vision problems. Vision problems persists. Optho does not think due to dupi - re-trialed dupixent x 1 dose, and noted significant achy eyes/ dizziness impacting daily life, thus stopped. Did not notice any difference in skin. 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 Acevedo at [place]. TCR gene rearrangement studies were not done on 6/2021 biopsy. - Prev on MTX since 6/2022, most recent dose since 10/2022 25mg weekly - 2/2023. - S/p Adbry 12/7/23-6/3/24 w/ no improvement (no eye symptoms)  s/p patch testing 2018 -at that time sent a safe list to email address ([email or web address]) given for the allergens positive from 1-50 - colophony - disperse blue mix - cocamidopropyl betaine  M: Vaseline S: Cerave D: All Free and Clear, no fabric softener/dryer sheets

## 2024-09-19 NOTE — ASSESSMENT
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  - One biopsy also suggestive of possible psoriasis. However, failed cosentyx in the past  Chronic, not at goal Patient previously presented at  Jan-Feb, 2022 s/p patch testing 2018 -at that time sent a safe list to email address ([email or web address]) given for the allergens positive from 1-50 - colophony - disperse blue mix - cocamidopropyl betaine  [place] HISTORY:  - Dupixent 9/2019-11/2020, stopped due to blurry vision that has persisted. Normal eye exam per optho (Cleared by Dr. Land) for dupixent 12/2022, re-tried Feb 2023 loading dose and 3 days later again developed eye symptoms and decided to self-stop - nbUVB 1/2022-5/2022. No longer on phototherapy 2/2 red erythema 2-3 hrs following NBUVB session s/p 34 sessions at [facility] with unknown amt outside, without clinical improvement - MTX 6/2022-2/2023 - Cosentyx for possible psoriasis in June 2023 however his skin not improved.  - Adbry 12/7/2023 to 7/2024, stopped as it was not 100% effective and wanted to trial tx that would treat AA as well - s/p Rinvoq 15 mg daily  July 2024-Aug 2024, stopped due to eye symptoms Skin is flaring off all treatments   ---- multiple biopsies ---- Shave biopsy of lesion (10/2018) w/ subacute spongiotic dermatitis consistent with AD Repeat broad shave 06/2021 c/w prior, spongiotic and psoriasiform dermatitis,CD4: CD8 ratio is normal, no loss of CD7, c/w subactue eczematous process Labs 7/21 (cbc, cmp, peripheral flow) wnl. 1/22 Prior Bx x3 sent to Dr. Acevedo at [place] -- C/w CTCL/early MF arising within the background of atopic dermatitis (TCR gene rearrangement studies not done) Most recent biopsy 4/2023 suggestive of #psoriasis ------------------------------  PLAN: - Plan to START abrocitinib 100mg daily pending insurance approval - f/u ophthalmology (Dr. Land) for evaluation of eye symptoms. May consider neurology consult if ophtho evaluation normal.  - Continue allergen avoidance and use of SAFE list (prior test in 2018). Consider repeating patch testing. - Gentle skin care discussed, emphasized the importance of liberal Vaseline use - For face: continue HC 2.5% ointment BID to affected areas on the face prn flare, 2 weeks on/1 week off, SED - For the body/hands: continue triamcinolone 0.1% ointment BID PRN itch/roughness use for up to 2 weeks on, then 1 week off. Repeat as needed. SED. - Continue tacrolimus 0.1% BID in between flares  - Continue zyrtec nightly for itch prn  # Hair loss, likely 2/2 Alopecia Areata Fungal culture from 11/07/23 negative.  - Resolved s/p ILK x 4 , however pt reports return of symptoms with hair falling out and scalp itch - clobetasol solution PRN for itch - abrocitinib as above  High Risk Medication Use, Rinvoq  - CBC, CMP, lipid profile, hepatitis panel, and quant tb 6/3/2024 wnl - r/b/a Jennifer discussed including but not limited to immunosuppression, LFT elevation, HLD and blood clots  RTC 1 month

## 2024-09-27 ENCOUNTER — APPOINTMENT (OUTPATIENT)
Dept: DERMATOLOGY | Facility: CLINIC | Age: 19
End: 2024-09-27

## 2024-10-01 RX ORDER — ABROCITINIB 100 MG/1
100 TABLET, FILM COATED ORAL
Qty: 30 | Refills: 3 | Status: ACTIVE | COMMUNITY
Start: 2024-10-01 | End: 1900-01-01

## 2024-10-15 ENCOUNTER — NON-APPOINTMENT (OUTPATIENT)
Age: 19
End: 2024-10-15

## 2024-10-28 DIAGNOSIS — Z79.899 OTHER LONG TERM (CURRENT) DRUG THERAPY: ICD-10-CM

## 2024-11-14 ENCOUNTER — APPOINTMENT (OUTPATIENT)
Dept: DERMATOLOGY | Facility: CLINIC | Age: 19
End: 2024-11-14
Payer: COMMERCIAL

## 2024-11-14 VITALS — BODY MASS INDEX: 22.5 KG/M2 | WEIGHT: 140 LBS | HEIGHT: 66 IN

## 2024-11-14 PROCEDURE — G2211 COMPLEX E/M VISIT ADD ON: CPT

## 2024-11-14 PROCEDURE — 99214 OFFICE O/P EST MOD 30 MIN: CPT

## 2024-11-17 LAB
ALBUMIN SERPL ELPH-MCNC: 4.6 G/DL
ALP BLD-CCNC: 57 U/L
ALT SERPL-CCNC: 49 U/L
ANION GAP SERPL CALC-SCNC: 11 MMOL/L
AST SERPL-CCNC: 27 U/L
BASOPHILS # BLD AUTO: 0.02 K/UL
BASOPHILS NFR BLD AUTO: 0.3 %
BILIRUB SERPL-MCNC: 0.2 MG/DL
BUN SERPL-MCNC: 16 MG/DL
CALCIUM SERPL-MCNC: 9.9 MG/DL
CHLORIDE SERPL-SCNC: 104 MMOL/L
CO2 SERPL-SCNC: 25 MMOL/L
CREAT SERPL-MCNC: 0.81 MG/DL
EGFR: 129 ML/MIN/1.73M2
EOSINOPHIL # BLD AUTO: 0.12 K/UL
EOSINOPHIL NFR BLD AUTO: 1.6 %
GLUCOSE SERPL-MCNC: 92 MG/DL
HCT VFR BLD CALC: 42.2 %
HGB BLD-MCNC: 14.2 G/DL
IMM GRANULOCYTES NFR BLD AUTO: 0.3 %
LYMPHOCYTES # BLD AUTO: 2.62 K/UL
LYMPHOCYTES NFR BLD AUTO: 35.7 %
MAN DIFF?: NORMAL
MCHC RBC-ENTMCNC: 31 PG
MCHC RBC-ENTMCNC: 33.6 G/DL
MCV RBC AUTO: 92.1 FL
MONOCYTES # BLD AUTO: 0.65 K/UL
MONOCYTES NFR BLD AUTO: 8.9 %
NEUTROPHILS # BLD AUTO: 3.91 K/UL
NEUTROPHILS NFR BLD AUTO: 53.2 %
PLATELET # BLD AUTO: 274 K/UL
POTASSIUM SERPL-SCNC: 4.1 MMOL/L
PROT SERPL-MCNC: 7.4 G/DL
RBC # BLD: 4.58 M/UL
RBC # FLD: 12.1 %
SODIUM SERPL-SCNC: 139 MMOL/L
WBC # FLD AUTO: 7.34 K/UL

## 2024-12-16 ENCOUNTER — APPOINTMENT (OUTPATIENT)
Dept: DERMATOLOGY | Facility: CLINIC | Age: 19
End: 2024-12-16
Payer: COMMERCIAL

## 2024-12-16 VITALS — WEIGHT: 140 LBS | BODY MASS INDEX: 22.5 KG/M2 | HEIGHT: 66 IN

## 2024-12-16 DIAGNOSIS — L20.9 ATOPIC DERMATITIS, UNSPECIFIED: ICD-10-CM

## 2024-12-16 DIAGNOSIS — L63.9 ALOPECIA AREATA, UNSPECIFIED: ICD-10-CM

## 2024-12-16 DIAGNOSIS — L21.9 SEBORRHEIC DERMATITIS, UNSPECIFIED: ICD-10-CM

## 2024-12-16 PROCEDURE — 99215 OFFICE O/P EST HI 40 MIN: CPT

## 2024-12-16 PROCEDURE — G2211 COMPLEX E/M VISIT ADD ON: CPT

## 2024-12-16 RX ORDER — CYCLOSPORINE 100 MG/1
100 CAPSULE, LIQUID FILLED ORAL
Qty: 60 | Refills: 0 | Status: ACTIVE | COMMUNITY
Start: 2024-12-16 | End: 1900-01-01

## 2025-01-07 ENCOUNTER — APPOINTMENT (OUTPATIENT)
Dept: DERMATOLOGY | Facility: CLINIC | Age: 20
End: 2025-01-07

## 2025-01-13 ENCOUNTER — APPOINTMENT (OUTPATIENT)
Dept: DERMATOLOGY | Facility: CLINIC | Age: 20
End: 2025-01-13

## 2025-01-13 DIAGNOSIS — Z79.899 OTHER LONG TERM (CURRENT) DRUG THERAPY: ICD-10-CM

## 2025-01-13 DIAGNOSIS — L20.9 ATOPIC DERMATITIS, UNSPECIFIED: ICD-10-CM

## 2025-01-13 DIAGNOSIS — L21.9 SEBORRHEIC DERMATITIS, UNSPECIFIED: ICD-10-CM

## 2025-01-13 PROCEDURE — G2211 COMPLEX E/M VISIT ADD ON: CPT

## 2025-01-13 PROCEDURE — 99215 OFFICE O/P EST HI 40 MIN: CPT

## 2025-02-05 ENCOUNTER — APPOINTMENT (OUTPATIENT)
Dept: DERMATOLOGY | Facility: CLINIC | Age: 20
End: 2025-02-05
Payer: COMMERCIAL

## 2025-02-05 DIAGNOSIS — L63.9 ALOPECIA AREATA, UNSPECIFIED: ICD-10-CM

## 2025-02-05 DIAGNOSIS — L30.9 DERMATITIS, UNSPECIFIED: ICD-10-CM

## 2025-02-05 PROCEDURE — 99214 OFFICE O/P EST MOD 30 MIN: CPT

## 2025-02-05 RX ORDER — CYCLOSPORINE 50 MG/1
50 CAPSULE, LIQUID FILLED ORAL
Qty: 3 | Refills: 1 | Status: ACTIVE | COMMUNITY
Start: 2025-02-05 | End: 1900-01-01

## 2025-02-10 ENCOUNTER — NON-APPOINTMENT (OUTPATIENT)
Age: 20
End: 2025-02-10

## 2025-02-12 LAB
ALBUMIN SERPL ELPH-MCNC: 4.8 G/DL
ALP BLD-CCNC: 61 U/L
ALT SERPL-CCNC: 53 U/L
ANION GAP SERPL CALC-SCNC: 9 MMOL/L
AST SERPL-CCNC: 26 U/L
BASOPHILS # BLD AUTO: 0.02 K/UL
BASOPHILS NFR BLD AUTO: 0.3 %
BILIRUB SERPL-MCNC: 0.3 MG/DL
BUN SERPL-MCNC: 17 MG/DL
CALCIUM SERPL-MCNC: 10 MG/DL
CHLORIDE SERPL-SCNC: 100 MMOL/L
CHOLEST SERPL-MCNC: 127 MG/DL
CO2 SERPL-SCNC: 29 MMOL/L
CREAT SERPL-MCNC: 1.12 MG/DL
EGFR: 96 ML/MIN/1.73M2
EOSINOPHIL # BLD AUTO: 0.02 K/UL
EOSINOPHIL NFR BLD AUTO: 0.3 %
GLUCOSE SERPL-MCNC: 91 MG/DL
HCT VFR BLD CALC: 41.9 %
HDLC SERPL-MCNC: 33 MG/DL
HGB BLD-MCNC: 14.2 G/DL
IMM GRANULOCYTES NFR BLD AUTO: 0.4 %
LDLC SERPL CALC-MCNC: 64 MG/DL
LYMPHOCYTES # BLD AUTO: 2.4 K/UL
LYMPHOCYTES NFR BLD AUTO: 32.6 %
MAGNESIUM SERPL-MCNC: 1.8 MG/DL
MAN DIFF?: NORMAL
MCHC RBC-ENTMCNC: 30.6 PG
MCHC RBC-ENTMCNC: 33.9 G/DL
MCV RBC AUTO: 90.3 FL
MONOCYTES # BLD AUTO: 0.52 K/UL
MONOCYTES NFR BLD AUTO: 7.1 %
NEUTROPHILS # BLD AUTO: 4.38 K/UL
NEUTROPHILS NFR BLD AUTO: 59.3 %
NONHDLC SERPL-MCNC: 94 MG/DL
PLATELET # BLD AUTO: 332 K/UL
POTASSIUM SERPL-SCNC: 4.5 MMOL/L
PROT SERPL-MCNC: 7.8 G/DL
RBC # BLD: 4.64 M/UL
RBC # FLD: 12.1 %
SODIUM SERPL-SCNC: 139 MMOL/L
TRIGL SERPL-MCNC: 182 MG/DL
WBC # FLD AUTO: 7.37 K/UL

## 2025-06-18 ENCOUNTER — LABORATORY RESULT (OUTPATIENT)
Age: 20
End: 2025-06-18

## 2025-06-18 ENCOUNTER — APPOINTMENT (OUTPATIENT)
Dept: DERMATOLOGY | Facility: CLINIC | Age: 20
End: 2025-06-18
Payer: COMMERCIAL

## 2025-06-18 PROCEDURE — 99215 OFFICE O/P EST HI 40 MIN: CPT

## 2025-06-18 PROCEDURE — G2211 COMPLEX E/M VISIT ADD ON: CPT | Mod: NC

## 2025-06-18 RX ORDER — MUPIROCIN 20 MG/G
2 OINTMENT TOPICAL
Qty: 2 | Refills: 2 | Status: ACTIVE | COMMUNITY
Start: 2025-06-18 | End: 1900-01-01

## 2025-06-19 ENCOUNTER — NON-APPOINTMENT (OUTPATIENT)
Age: 20
End: 2025-06-19

## 2025-06-19 LAB
ALBUMIN SERPL ELPH-MCNC: 4.8 G/DL
ALP BLD-CCNC: 62 U/L
ALT SERPL-CCNC: 110 U/L
ANION GAP SERPL CALC-SCNC: 17 MMOL/L
AST SERPL-CCNC: 47 U/L
BASOPHILS # BLD AUTO: 0.01 K/UL
BASOPHILS NFR BLD AUTO: 0.1 %
BILIRUB SERPL-MCNC: 0.2 MG/DL
BUN SERPL-MCNC: 15 MG/DL
CALCIUM SERPL-MCNC: 9.8 MG/DL
CHLORIDE SERPL-SCNC: 103 MMOL/L
CHOLEST SERPL-MCNC: 133 MG/DL
CO2 SERPL-SCNC: 23 MMOL/L
CREAT SERPL-MCNC: 0.94 MG/DL
EGFRCR SERPLBLD CKD-EPI 2021: 118 ML/MIN/1.73M2
EOSINOPHIL # BLD AUTO: 0.07 K/UL
EOSINOPHIL NFR BLD AUTO: 0.9 %
GLUCOSE SERPL-MCNC: 101 MG/DL
HCT VFR BLD CALC: 44.7 %
HDLC SERPL-MCNC: 32 MG/DL
HGB BLD-MCNC: 14.8 G/DL
IMM GRANULOCYTES NFR BLD AUTO: 0.1 %
LDLC SERPL-MCNC: 72 MG/DL
LYMPHOCYTES # BLD AUTO: 1.39 K/UL
LYMPHOCYTES NFR BLD AUTO: 18.8 %
MAGNESIUM SERPL-MCNC: 2.1 MG/DL
MAN DIFF?: NORMAL
MCHC RBC-ENTMCNC: 30.1 PG
MCHC RBC-ENTMCNC: 33.1 G/DL
MCV RBC AUTO: 91 FL
MONOCYTES # BLD AUTO: 0.77 K/UL
MONOCYTES NFR BLD AUTO: 10.4 %
NEUTROPHILS # BLD AUTO: 5.14 K/UL
NEUTROPHILS NFR BLD AUTO: 69.7 %
NONHDLC SERPL-MCNC: 101 MG/DL
PLATELET # BLD AUTO: 335 K/UL
POTASSIUM SERPL-SCNC: 4.3 MMOL/L
PROT SERPL-MCNC: 7.5 G/DL
RBC # BLD: 4.91 M/UL
RBC # FLD: 12.1 %
SODIUM SERPL-SCNC: 143 MMOL/L
TRIGL SERPL-MCNC: 172 MG/DL
URATE SERPL-MCNC: 6.2 MG/DL
WBC # FLD AUTO: 7.39 K/UL

## 2025-06-23 ENCOUNTER — APPOINTMENT (OUTPATIENT)
Dept: DERMATOLOGY | Facility: CLINIC | Age: 20
End: 2025-06-23
Payer: COMMERCIAL

## 2025-06-23 VITALS — WEIGHT: 145 LBS

## 2025-06-23 PROCEDURE — 99214 OFFICE O/P EST MOD 30 MIN: CPT

## 2025-06-23 PROCEDURE — G2211 COMPLEX E/M VISIT ADD ON: CPT | Mod: NC

## 2025-06-24 LAB
ALBUMIN SERPL ELPH-MCNC: 4.6 G/DL
ALP BLD-CCNC: 60 U/L
ALT SERPL-CCNC: 85 U/L
ANION GAP SERPL CALC-SCNC: 14 MMOL/L
AST SERPL-CCNC: 41 U/L
BILIRUB SERPL-MCNC: 0.2 MG/DL
BUN SERPL-MCNC: 14 MG/DL
CALCIUM SERPL-MCNC: 10.3 MG/DL
CHLORIDE SERPL-SCNC: 105 MMOL/L
CO2 SERPL-SCNC: 23 MMOL/L
CREAT SERPL-MCNC: 0.83 MG/DL
EGFRCR SERPLBLD CKD-EPI 2021: 128 ML/MIN/1.73M2
GLUCOSE SERPL-MCNC: 96 MG/DL
POTASSIUM SERPL-SCNC: 5 MMOL/L
PROT SERPL-MCNC: 7.3 G/DL
SODIUM SERPL-SCNC: 143 MMOL/L

## 2025-06-24 RX ORDER — NEMOLIZUMAB-ILTO 30 MG/100MG
30 INJECTION, POWDER, LYOPHILIZED, FOR SOLUTION SUBCUTANEOUS
Qty: 3 | Refills: 0 | Status: ACTIVE | COMMUNITY
Start: 2025-06-23 | End: 1900-01-01

## 2025-06-25 RX ORDER — CEFADROXIL 1000 MG/1
1 TABLET ORAL DAILY
Qty: 10 | Refills: 0 | Status: ACTIVE | COMMUNITY
Start: 2025-06-18

## 2025-06-25 RX ORDER — NEMOLIZUMAB-ILTO 30 MG/100MG
30 INJECTION, POWDER, LYOPHILIZED, FOR SOLUTION SUBCUTANEOUS
Qty: 2 | Refills: 0 | Status: ACTIVE | COMMUNITY
Start: 2025-06-23

## 2025-07-08 ENCOUNTER — APPOINTMENT (OUTPATIENT)
Dept: DERMATOLOGY | Facility: CLINIC | Age: 20
End: 2025-07-08
Payer: COMMERCIAL

## 2025-07-08 ENCOUNTER — LABORATORY RESULT (OUTPATIENT)
Age: 20
End: 2025-07-08

## 2025-07-08 PROCEDURE — 99214 OFFICE O/P EST MOD 30 MIN: CPT

## 2025-07-08 RX ORDER — DOXYCYCLINE 100 MG/1
100 TABLET, FILM COATED ORAL
Qty: 28 | Refills: 0 | Status: ACTIVE | COMMUNITY
Start: 2025-07-08 | End: 1900-01-01

## 2025-07-28 ENCOUNTER — APPOINTMENT (OUTPATIENT)
Dept: DERMATOLOGY | Facility: CLINIC | Age: 20
End: 2025-07-28
Payer: COMMERCIAL

## 2025-07-28 DIAGNOSIS — L20.9 ATOPIC DERMATITIS, UNSPECIFIED: ICD-10-CM

## 2025-07-28 DIAGNOSIS — L01.00 IMPETIGO, UNSPECIFIED: ICD-10-CM

## 2025-07-28 DIAGNOSIS — L63.9 ALOPECIA AREATA, UNSPECIFIED: ICD-10-CM

## 2025-07-28 PROCEDURE — 99214 OFFICE O/P EST MOD 30 MIN: CPT

## 2025-07-28 PROCEDURE — G2211 COMPLEX E/M VISIT ADD ON: CPT | Mod: NC

## 2025-07-31 ENCOUNTER — APPOINTMENT (OUTPATIENT)
Dept: DERMATOLOGY | Facility: CLINIC | Age: 20
End: 2025-07-31

## 2025-08-12 ENCOUNTER — APPOINTMENT (OUTPATIENT)
Dept: GASTROENTEROLOGY | Facility: CLINIC | Age: 20
End: 2025-08-12
Payer: COMMERCIAL

## 2025-08-12 VITALS
DIASTOLIC BLOOD PRESSURE: 63 MMHG | SYSTOLIC BLOOD PRESSURE: 114 MMHG | WEIGHT: 150 LBS | HEIGHT: 66 IN | HEART RATE: 62 BPM | BODY MASS INDEX: 24.11 KG/M2

## 2025-08-12 DIAGNOSIS — R74.01 ELEVATION OF LEVELS OF LIVER TRANSAMINASE LEVELS: ICD-10-CM

## 2025-08-12 PROCEDURE — 99204 OFFICE O/P NEW MOD 45 MIN: CPT

## 2025-08-12 RX ORDER — MINOXIDIL 2.5 MG/1
2.5 TABLET ORAL
Qty: 45 | Refills: 0 | Status: DISCONTINUED | COMMUNITY
Start: 2025-07-28 | End: 2025-08-12

## 2025-08-19 ENCOUNTER — APPOINTMENT (OUTPATIENT)
Dept: DERMATOLOGY | Facility: CLINIC | Age: 20
End: 2025-08-19
Payer: COMMERCIAL

## 2025-08-19 DIAGNOSIS — L20.9 ATOPIC DERMATITIS, UNSPECIFIED: ICD-10-CM

## 2025-08-19 DIAGNOSIS — L63.9 ALOPECIA AREATA, UNSPECIFIED: ICD-10-CM

## 2025-08-19 DIAGNOSIS — L81.8 OTHER SPECIFIED DISORDERS OF PIGMENTATION: ICD-10-CM

## 2025-08-19 PROCEDURE — 99215 OFFICE O/P EST HI 40 MIN: CPT

## 2025-08-19 PROCEDURE — G2211 COMPLEX E/M VISIT ADD ON: CPT | Mod: NC

## 2025-08-20 ENCOUNTER — OFFICE (OUTPATIENT)
Dept: URBAN - METROPOLITAN AREA CLINIC 109 | Facility: CLINIC | Age: 20
Setting detail: OPHTHALMOLOGY
End: 2025-08-20
Payer: COMMERCIAL

## 2025-08-20 DIAGNOSIS — B30.1: ICD-10-CM

## 2025-08-20 DIAGNOSIS — H16.221: ICD-10-CM

## 2025-08-20 PROCEDURE — 92014 COMPRE OPH EXAM EST PT 1/>: CPT | Performed by: OPHTHALMOLOGY

## 2025-08-20 ASSESSMENT — CONFRONTATIONAL VISUAL FIELD TEST (CVF)
OD_FINDINGS: FULL
OS_FINDINGS: FULL

## 2025-08-20 ASSESSMENT — TONOMETRY
OS_IOP_MMHG: 17
OD_IOP_MMHG: 16

## 2025-08-20 ASSESSMENT — REFRACTION_MANIFEST
OD_SPHERE: -2.25
OS_CYLINDER: SPH
OS_SPHERE: -2.50
OD_VA1: 20/20
OD_CYLINDER: SPH
OS_VA1: 20/20

## 2025-08-20 ASSESSMENT — REFRACTION_AUTOREFRACTION
OD_CYLINDER: -0.25
OS_CYLINDER: -0.50
OS_SPHERE: -2.75
OS_AXIS: 123
OD_SPHERE: -2.25
OD_AXIS: 001

## 2025-08-20 ASSESSMENT — KERATOMETRY
OD_K2POWER_DIOPTERS: 40.61
OD_K1POWER_DIOPTERS: 40.27
OS_K2POWER_DIOPTERS: 40.91
OS_K1POWER_DIOPTERS: 40.52
OS_AXISANGLE_DEGREES: 090
OD_AXISANGLE_DEGREES: 099

## 2025-08-20 ASSESSMENT — LID EXAM ASSESSMENTS
OD_MEIBOMITIS: RUL 1+
OS_MEIBOMITIS: LUL 1+

## 2025-08-20 ASSESSMENT — VISUAL ACUITY
OS_BCVA: 20/20-2
OD_BCVA: 20/20-2

## 2025-08-20 ASSESSMENT — SUPERFICIAL PUNCTATE KERATITIS (SPK): OD_SPK: ABSENT

## 2025-08-22 RX ORDER — DOXYCYCLINE HYCLATE 100 MG/1
100 TABLET, COATED ORAL
Qty: 28 | Refills: 0 | Status: ACTIVE | COMMUNITY
Start: 2025-08-22 | End: 1900-01-01

## 2025-08-22 RX ORDER — MUPIROCIN 20 MG/G
2 OINTMENT TOPICAL
Qty: 1 | Refills: 2 | Status: ACTIVE | COMMUNITY
Start: 2025-08-22 | End: 1900-01-01

## 2025-08-27 ENCOUNTER — DOCTOR'S OFFICE (OUTPATIENT)
Facility: LOCATION | Age: 20
Setting detail: OPHTHALMOLOGY
End: 2025-08-27
Payer: COMMERCIAL

## 2025-08-27 DIAGNOSIS — B30.1: ICD-10-CM

## 2025-08-27 DIAGNOSIS — H02.89: ICD-10-CM

## 2025-08-27 DIAGNOSIS — H16.221: ICD-10-CM

## 2025-08-27 PROCEDURE — 92012 INTRM OPH EXAM EST PATIENT: CPT | Performed by: OPHTHALMOLOGY

## 2025-08-27 ASSESSMENT — KERATOMETRY
OS_AXISANGLE_DEGREES: 090
OD_K1POWER_DIOPTERS: 40.27
OS_K1POWER_DIOPTERS: 40.52
OD_AXISANGLE_DEGREES: 099
OS_K2POWER_DIOPTERS: 40.91
OD_K2POWER_DIOPTERS: 40.61

## 2025-08-27 ASSESSMENT — LID EXAM ASSESSMENTS
OD_MEIBOMITIS: RUL 1+
OD_COMMENTS: MEIBOMIAN GLAND INSPISSATION
OS_COMMENTS: MEIBOMIAN GLAND INSPISSATION
OS_MEIBOMITIS: LUL 1+

## 2025-08-27 ASSESSMENT — REFRACTION_MANIFEST
OD_VA1: 20/20
OS_SPHERE: -2.50
OS_VA1: 20/20
OS_CYLINDER: SPH
OD_SPHERE: -2.25
OD_CYLINDER: SPH

## 2025-08-27 ASSESSMENT — REFRACTION_AUTOREFRACTION
OD_CYLINDER: -0.25
OS_SPHERE: -2.75
OS_CYLINDER: -0.50
OD_AXIS: 001
OS_AXIS: 123
OD_SPHERE: -2.25

## 2025-08-27 ASSESSMENT — VISUAL ACUITY
OS_BCVA: 20/20
OD_BCVA: 20/20

## 2025-09-10 ENCOUNTER — NON-APPOINTMENT (OUTPATIENT)
Age: 20
End: 2025-09-10

## 2025-09-10 ENCOUNTER — APPOINTMENT (OUTPATIENT)
Dept: ALLERGY | Facility: CLINIC | Age: 20
End: 2025-09-10
Payer: COMMERCIAL

## 2025-09-10 VITALS
HEART RATE: 74 BPM | OXYGEN SATURATION: 98 % | TEMPERATURE: 98.3 F | DIASTOLIC BLOOD PRESSURE: 58 MMHG | SYSTOLIC BLOOD PRESSURE: 112 MMHG

## 2025-09-10 PROCEDURE — 99204 OFFICE O/P NEW MOD 45 MIN: CPT | Mod: 25

## 2025-09-10 PROCEDURE — 95004 PERQ TESTS W/ALRGNC XTRCS: CPT
